# Patient Record
Sex: MALE | Race: WHITE | NOT HISPANIC OR LATINO
[De-identification: names, ages, dates, MRNs, and addresses within clinical notes are randomized per-mention and may not be internally consistent; named-entity substitution may affect disease eponyms.]

---

## 2017-02-07 ENCOUNTER — APPOINTMENT (OUTPATIENT)
Dept: INTERNAL MEDICINE | Facility: CLINIC | Age: 77
End: 2017-02-07

## 2017-06-08 ENCOUNTER — APPOINTMENT (OUTPATIENT)
Dept: INTERNAL MEDICINE | Facility: CLINIC | Age: 77
End: 2017-06-08

## 2017-06-26 ENCOUNTER — APPOINTMENT (OUTPATIENT)
Dept: INTERNAL MEDICINE | Facility: CLINIC | Age: 77
End: 2017-06-26

## 2017-07-24 ENCOUNTER — APPOINTMENT (OUTPATIENT)
Dept: INTERNAL MEDICINE | Facility: CLINIC | Age: 77
End: 2017-07-24

## 2017-09-06 ENCOUNTER — APPOINTMENT (OUTPATIENT)
Dept: INTERNAL MEDICINE | Facility: CLINIC | Age: 77
End: 2017-09-06
Payer: MEDICARE

## 2017-09-06 PROCEDURE — 90662 IIV NO PRSV INCREASED AG IM: CPT

## 2017-09-06 PROCEDURE — G0008: CPT

## 2017-09-06 PROCEDURE — 99214 OFFICE O/P EST MOD 30 MIN: CPT | Mod: 25

## 2017-11-22 ENCOUNTER — APPOINTMENT (OUTPATIENT)
Dept: INTERNAL MEDICINE | Facility: CLINIC | Age: 77
End: 2017-11-22
Payer: MEDICARE

## 2017-11-22 PROCEDURE — 99215 OFFICE O/P EST HI 40 MIN: CPT

## 2018-06-27 ENCOUNTER — APPOINTMENT (OUTPATIENT)
Dept: DERMATOLOGY | Facility: CLINIC | Age: 78
End: 2018-06-27
Payer: MEDICARE

## 2018-06-27 PROCEDURE — 11901 INJECT SKIN LESIONS >7: CPT

## 2018-06-27 PROCEDURE — 99202 OFFICE O/P NEW SF 15 MIN: CPT | Mod: 25

## 2018-07-23 ENCOUNTER — RECORD ABSTRACTING (OUTPATIENT)
Age: 78
End: 2018-07-23

## 2018-07-23 ENCOUNTER — RX RENEWAL (OUTPATIENT)
Age: 78
End: 2018-07-23

## 2018-07-23 DIAGNOSIS — Z92.3 PERSONAL HISTORY OF IRRADIATION: ICD-10-CM

## 2018-07-23 DIAGNOSIS — Z87.898 PERSONAL HISTORY OF OTHER SPECIFIED CONDITIONS: ICD-10-CM

## 2018-07-23 DIAGNOSIS — Z87.891 PERSONAL HISTORY OF NICOTINE DEPENDENCE: ICD-10-CM

## 2018-07-23 DIAGNOSIS — Z85.46 PERSONAL HISTORY OF MALIGNANT NEOPLASM OF PROSTATE: ICD-10-CM

## 2018-07-23 RX ORDER — CHLORHEXIDINE GLUCONATE 4 %
325 (65 FE) LIQUID (ML) TOPICAL 3 TIMES DAILY
Refills: 0 | Status: ACTIVE | COMMUNITY

## 2018-07-23 RX ORDER — MELOXICAM 15 MG/1
15 TABLET ORAL DAILY
Refills: 0 | Status: ACTIVE | COMMUNITY

## 2018-08-13 ENCOUNTER — APPOINTMENT (OUTPATIENT)
Dept: DERMATOLOGY | Facility: CLINIC | Age: 78
End: 2018-08-13
Payer: MEDICARE

## 2018-08-13 PROCEDURE — 99213 OFFICE O/P EST LOW 20 MIN: CPT

## 2018-08-16 ENCOUNTER — APPOINTMENT (OUTPATIENT)
Dept: INTERNAL MEDICINE | Facility: CLINIC | Age: 78
End: 2018-08-16
Payer: MEDICARE

## 2018-08-16 VITALS
HEIGHT: 62 IN | DIASTOLIC BLOOD PRESSURE: 80 MMHG | WEIGHT: 139 LBS | SYSTOLIC BLOOD PRESSURE: 130 MMHG | BODY MASS INDEX: 25.58 KG/M2

## 2018-08-16 DIAGNOSIS — J45.909 UNSPECIFIED ASTHMA, UNCOMPLICATED: ICD-10-CM

## 2018-08-16 DIAGNOSIS — M19.90 UNSPECIFIED OSTEOARTHRITIS, UNSPECIFIED SITE: ICD-10-CM

## 2018-08-16 DIAGNOSIS — L28.1 PRURIGO NODULARIS: ICD-10-CM

## 2018-08-16 PROCEDURE — 36415 COLL VENOUS BLD VENIPUNCTURE: CPT

## 2018-08-16 PROCEDURE — 99214 OFFICE O/P EST MOD 30 MIN: CPT | Mod: 25

## 2018-08-16 NOTE — HISTORY OF PRESENT ILLNESS
[FreeTextEntry1] : F/U  on B/P  [de-identified] : PT HERE FOR FOLLOW Overall feels okay he has seen a dermatologist and they made the diagnosis of PRURIGO NODULARIS

## 2018-08-24 ENCOUNTER — MEDICATION RENEWAL (OUTPATIENT)
Age: 78
End: 2018-08-24

## 2018-09-25 LAB
ALBUMIN SERPL ELPH-MCNC: 4.2 G/DL
ALP BLD-CCNC: 130 U/L
ALT SERPL-CCNC: 20 U/L
ANION GAP SERPL CALC-SCNC: 12 MMOL/L
AST SERPL-CCNC: 24 U/L
BASOPHILS # BLD AUTO: 0.03 K/UL
BASOPHILS NFR BLD AUTO: 0.8 %
BILIRUB SERPL-MCNC: 0.6 MG/DL
BUN SERPL-MCNC: 22 MG/DL
CALCIUM SERPL-MCNC: 9.5 MG/DL
CHLORIDE SERPL-SCNC: 103 MMOL/L
CO2 SERPL-SCNC: 27 MMOL/L
CREAT SERPL-MCNC: 1.02 MG/DL
EOSINOPHIL # BLD AUTO: 0.22 K/UL
EOSINOPHIL NFR BLD AUTO: 5.7 %
GLUCOSE SERPL-MCNC: 85 MG/DL
HCT VFR BLD CALC: 42.3 %
HGB BLD-MCNC: 13.5 G/DL
IMM GRANULOCYTES NFR BLD AUTO: 0.3 %
LYMPHOCYTES # BLD AUTO: 0.68 K/UL
LYMPHOCYTES NFR BLD AUTO: 17.7 %
MAN DIFF?: NORMAL
MCHC RBC-ENTMCNC: 30.5 PG
MCHC RBC-ENTMCNC: 31.9 GM/DL
MCV RBC AUTO: 95.7 FL
MONOCYTES # BLD AUTO: 0.32 K/UL
MONOCYTES NFR BLD AUTO: 8.3 %
NEUTROPHILS # BLD AUTO: 2.58 K/UL
NEUTROPHILS NFR BLD AUTO: 67.2 %
PLATELET # BLD AUTO: 239 K/UL
POTASSIUM SERPL-SCNC: 4.5 MMOL/L
PROT SERPL-MCNC: 6.2 G/DL
RBC # BLD: 4.42 M/UL
RBC # FLD: 13.1 %
SODIUM SERPL-SCNC: 142 MMOL/L
T4 SERPL-MCNC: 6.8 UG/DL
TSH SERPL-ACNC: 2.22 UIU/ML
WBC # FLD AUTO: 3.84 K/UL

## 2018-10-15 ENCOUNTER — APPOINTMENT (OUTPATIENT)
Dept: INTERNAL MEDICINE | Facility: CLINIC | Age: 78
End: 2018-10-15
Payer: MEDICARE

## 2018-10-15 ENCOUNTER — TRANSCRIPTION ENCOUNTER (OUTPATIENT)
Age: 78
End: 2018-10-15

## 2018-10-15 DIAGNOSIS — Z23 ENCOUNTER FOR IMMUNIZATION: ICD-10-CM

## 2018-10-15 PROCEDURE — 90662 IIV NO PRSV INCREASED AG IM: CPT

## 2018-10-15 PROCEDURE — G0008: CPT

## 2018-11-20 ENCOUNTER — MEDICATION RENEWAL (OUTPATIENT)
Age: 78
End: 2018-11-20

## 2019-01-07 ENCOUNTER — APPOINTMENT (OUTPATIENT)
Dept: INTERNAL MEDICINE | Facility: CLINIC | Age: 79
End: 2019-01-07
Payer: MEDICARE

## 2019-01-07 VITALS
TEMPERATURE: 98.9 F | BODY MASS INDEX: 25.76 KG/M2 | WEIGHT: 140 LBS | DIASTOLIC BLOOD PRESSURE: 70 MMHG | HEIGHT: 62 IN | SYSTOLIC BLOOD PRESSURE: 130 MMHG

## 2019-01-07 DIAGNOSIS — J45.909 UNSPECIFIED ASTHMA, UNCOMPLICATED: ICD-10-CM

## 2019-01-07 DIAGNOSIS — B97.89 ACUTE UPPER RESPIRATORY INFECTION, UNSPECIFIED: ICD-10-CM

## 2019-01-07 DIAGNOSIS — J06.9 ACUTE UPPER RESPIRATORY INFECTION, UNSPECIFIED: ICD-10-CM

## 2019-01-07 PROCEDURE — 99214 OFFICE O/P EST MOD 30 MIN: CPT

## 2019-01-07 RX ORDER — KETOCONAZOLE 20 MG/G
2 CREAM TOPICAL
Qty: 60 | Refills: 0 | Status: ACTIVE | COMMUNITY
Start: 2018-06-27

## 2019-01-07 RX ORDER — CLOBETASOL PROPIONATE 0.5 MG/G
0.05 CREAM TOPICAL
Qty: 60 | Refills: 0 | Status: ACTIVE | COMMUNITY
Start: 2018-06-29

## 2019-01-07 NOTE — HISTORY OF PRESENT ILLNESS
[FreeTextEntry1] : cough [de-identified] : Mr. PHILIP FORMAN is a 78 year male comes to the office c/o cough productive of yellow sputum x 1 weeks. Patient denies sick contact. Patient has no other complaints at this time.

## 2019-01-07 NOTE — PHYSICAL EXAM
[No Acute Distress] : no acute distress [Well Nourished] : well nourished [Well Developed] : well developed [Well-Appearing] : well-appearing [Normal Sclera/Conjunctiva] : normal sclera/conjunctiva [PERRL] : pupils equal round and reactive to light [EOMI] : extraocular movements intact [No JVD] : no jugular venous distention [Supple] : supple [No Lymphadenopathy] : no lymphadenopathy [No Respiratory Distress] : no respiratory distress  [No Accessory Muscle Use] : no accessory muscle use [Normal Rate] : normal rate  [Regular Rhythm] : with a regular rhythm [Normal S1, S2] : normal S1 and S2 [Soft] : abdomen soft [Non Tender] : non-tender [Non-distended] : non-distended [No Joint Swelling] : no joint swelling [Grossly Normal Strength/Tone] : grossly normal strength/tone [Normal Gait] : normal gait [Coordination Grossly Intact] : coordination grossly intact [No Focal Deficits] : no focal deficits [de-identified] : bilateral cerumen impaction, cobblestoning of pharynx [de-identified] : mild bilateral wheezing

## 2019-01-07 NOTE — PLAN
[FreeTextEntry1] : patient likely with bronchitis, will give steroids given history of asthma. patient advised to continue to take respiratory medications as prescribed and to go to the hospital if SOB worsens.\par \par Patient's signs and symptoms consistent with URI. Patient educated about signs and symptoms of URI, they typically last up to 14 days but may last longer. Patient advised to continue OTC medication for symptomatic relief. \par \par Counseling included abnormal lab results, differential diagnoses, treatment options, risks and benefits, lifestyle changes, prognosis, current condition, medications, and dose adjustments. \par The patient was interactive, attentive, asked questions, and verbalized understanding

## 2019-01-14 ENCOUNTER — RX RENEWAL (OUTPATIENT)
Age: 79
End: 2019-01-14

## 2019-01-24 ENCOUNTER — APPOINTMENT (OUTPATIENT)
Dept: INTERNAL MEDICINE | Facility: CLINIC | Age: 79
End: 2019-01-24
Payer: MEDICARE

## 2019-01-24 VITALS
BODY MASS INDEX: 25.95 KG/M2 | SYSTOLIC BLOOD PRESSURE: 130 MMHG | HEIGHT: 62 IN | WEIGHT: 141 LBS | DIASTOLIC BLOOD PRESSURE: 70 MMHG

## 2019-01-24 DIAGNOSIS — H61.23 IMPACTED CERUMEN, BILATERAL: ICD-10-CM

## 2019-01-24 PROCEDURE — 69209 REMOVE IMPACTED EAR WAX UNI: CPT | Mod: 50

## 2019-01-24 PROCEDURE — 99214 OFFICE O/P EST MOD 30 MIN: CPT | Mod: 25

## 2019-01-24 RX ORDER — BROMPHENIRAMINE MALEATE, PSEUDOEPHEDRINE HYDROCHLORIDE, 2; 30; 10 MG/5ML; MG/5ML; MG/5ML
30-2-10 SYRUP ORAL
Qty: 1 | Refills: 0 | Status: COMPLETED | COMMUNITY
Start: 2019-01-07 | End: 2019-01-24

## 2019-01-24 RX ORDER — PREDNISONE 20 MG/1
20 TABLET ORAL
Qty: 5 | Refills: 0 | Status: COMPLETED | COMMUNITY
Start: 2019-01-07 | End: 2019-01-24

## 2019-01-24 NOTE — HISTORY OF PRESENT ILLNESS
[FreeTextEntry1] : impacted ear [de-identified] : Mr. PHILIP FORMAN is a 78 year male comes to the office for cerumen removal. Patient feels well and has no complaints at this time.

## 2019-01-24 NOTE — PHYSICAL EXAM
[No Acute Distress] : no acute distress [Well Nourished] : well nourished [Well Developed] : well developed [Well-Appearing] : well-appearing [Normal Sclera/Conjunctiva] : normal sclera/conjunctiva [PERRL] : pupils equal round and reactive to light [EOMI] : extraocular movements intact [No JVD] : no jugular venous distention [Supple] : supple [No Lymphadenopathy] : no lymphadenopathy [No Respiratory Distress] : no respiratory distress  [Clear to Auscultation] : lungs were clear to auscultation bilaterally [No Accessory Muscle Use] : no accessory muscle use [Normal Rate] : normal rate  [Regular Rhythm] : with a regular rhythm [Normal S1, S2] : normal S1 and S2 [Soft] : abdomen soft [Non Tender] : non-tender [Non-distended] : non-distended [No Joint Swelling] : no joint swelling [Grossly Normal Strength/Tone] : grossly normal strength/tone [Normal Gait] : normal gait [Coordination Grossly Intact] : coordination grossly intact [No Focal Deficits] : no focal deficits [de-identified] : bilateral cerumen impaction, cobblestoning of pharynx

## 2019-01-24 NOTE — PLAN
[FreeTextEntry1] : patient's BP well controlled with current medication. will continue current  regimen \par \par Patient cerumen removed bilaterally with irrigation and manual removal of wax with plastic ear curette when visible from outside. Patient able to hear more clearly after procedure. \par \par Counseling included abnormal lab results, differential diagnoses, treatment options, risks and benefits, lifestyle changes, prognosis, current condition, medications, and dose adjustments. \par The patient was interactive, attentive, asked questions, and verbalized understanding

## 2019-01-25 LAB — RESPIRATORY PATH PNL SPEC CULT: ABNORMAL

## 2019-01-31 ENCOUNTER — RX RENEWAL (OUTPATIENT)
Age: 79
End: 2019-01-31

## 2019-03-07 ENCOUNTER — RX RENEWAL (OUTPATIENT)
Age: 79
End: 2019-03-07

## 2019-04-16 ENCOUNTER — RX RENEWAL (OUTPATIENT)
Age: 79
End: 2019-04-16

## 2019-04-30 ENCOUNTER — NON-APPOINTMENT (OUTPATIENT)
Age: 79
End: 2019-04-30

## 2019-04-30 ENCOUNTER — APPOINTMENT (OUTPATIENT)
Dept: INTERNAL MEDICINE | Facility: CLINIC | Age: 79
End: 2019-04-30
Payer: MEDICARE

## 2019-04-30 VITALS
HEIGHT: 62 IN | WEIGHT: 147.25 LBS | TEMPERATURE: 98.4 F | BODY MASS INDEX: 27.1 KG/M2 | DIASTOLIC BLOOD PRESSURE: 80 MMHG | HEART RATE: 74 BPM | OXYGEN SATURATION: 98 % | SYSTOLIC BLOOD PRESSURE: 130 MMHG

## 2019-04-30 DIAGNOSIS — Z23 ENCOUNTER FOR IMMUNIZATION: ICD-10-CM

## 2019-04-30 PROCEDURE — G0444 DEPRESSION SCREEN ANNUAL: CPT | Mod: 59

## 2019-04-30 PROCEDURE — 90732 PPSV23 VACC 2 YRS+ SUBQ/IM: CPT

## 2019-04-30 PROCEDURE — G0439: CPT

## 2019-04-30 PROCEDURE — 99497 ADVNCD CARE PLAN 30 MIN: CPT

## 2019-04-30 PROCEDURE — 99214 OFFICE O/P EST MOD 30 MIN: CPT | Mod: 25

## 2019-04-30 PROCEDURE — G0009: CPT

## 2019-04-30 NOTE — PLAN
[FreeTextEntry1] : In regards to patients Physical exam, routine blood work drawn, will review results with patient. EKG reviewed in office\par \par \par Counseling included abnormal lab results, differential diagnoses, treatment options, risks and benefits, lifestyle changes, prognosis, current condition, medications, and dose adjustments. \par The patient was interactive, attentive, asked questions, and verbalized understanding

## 2019-04-30 NOTE — HEALTH RISK ASSESSMENT
[Good] : ~his/her~  mood as  good [0] : 2) Feeling down, depressed, or hopeless: Not at all (0) [Patient declined Low Dose CT Scan] : Patient declined Low Dose CT Scan [Patient declined Retinal Exam] : Patient declined Retinal Exam. [Patient declined mammogram] : Patient declined mammogram [Patient declined PAP Smear] : Patient declined PAP Smear [Patient declined bone density test] : Patient declined bone density test [Patient declined colonoscopy] : Patient declined colonoscopy [HIV test declined] : HIV test declined [Hepatitis C test declined] : Hepatitis C test declined [Reviewed updated] : Reviewed updated [Designated Healthcare Proxy] : Designated healthcare proxy [Name: ___] : Health Care Proxy's Name: [unfilled]  [Relationship: ___] : Relationship: [unfilled] [Aggressive treatment] : aggressive treatment [I will adhere to the patient's wishes as expressed in the advance directive except as noted below.] : I will adhere to the patient's wishes as expressed in the advance directive except as noted below [] : No [[id number]] : 0 [AdvancecareDate] : 04/19

## 2019-04-30 NOTE — HISTORY OF PRESENT ILLNESS
[FreeTextEntry1] : physical  [de-identified] : Mr. PHILIP FORMAN is a 78 year male comes to the office PMH HTN, Hypothyroidism, BPH comes to the office for physical exam. Patient feels well and has no complaints at this time.

## 2019-05-01 LAB
ALBUMIN SERPL ELPH-MCNC: 4.1 G/DL
ALP BLD-CCNC: 127 U/L
ALT SERPL-CCNC: 25 U/L
ANION GAP SERPL CALC-SCNC: 12 MMOL/L
AST SERPL-CCNC: 23 U/L
BASOPHILS # BLD AUTO: 0.02 K/UL
BASOPHILS NFR BLD AUTO: 0.4 %
BILIRUB SERPL-MCNC: 0.6 MG/DL
BUN SERPL-MCNC: 21 MG/DL
CALCIUM SERPL-MCNC: 9.4 MG/DL
CHLORIDE SERPL-SCNC: 106 MMOL/L
CHOLEST SERPL-MCNC: 150 MG/DL
CHOLEST/HDLC SERPL: 2.3 RATIO
CO2 SERPL-SCNC: 27 MMOL/L
CREAT SERPL-MCNC: 1.08 MG/DL
EOSINOPHIL # BLD AUTO: 0 K/UL
EOSINOPHIL NFR BLD AUTO: 0 %
ESTIMATED AVERAGE GLUCOSE: 120 MG/DL
GLUCOSE SERPL-MCNC: 100 MG/DL
HBA1C MFR BLD HPLC: 5.8 %
HCT VFR BLD CALC: 43.5 %
HDLC SERPL-MCNC: 64 MG/DL
HGB BLD-MCNC: 13.9 G/DL
IMM GRANULOCYTES NFR BLD AUTO: 0.2 %
LDLC SERPL CALC-MCNC: 71 MG/DL
LYMPHOCYTES # BLD AUTO: 0.65 K/UL
LYMPHOCYTES NFR BLD AUTO: 11.9 %
MAN DIFF?: NORMAL
MCHC RBC-ENTMCNC: 31.9 PG
MCHC RBC-ENTMCNC: 32 GM/DL
MCV RBC AUTO: 99.8 FL
MONOCYTES # BLD AUTO: 0.53 K/UL
MONOCYTES NFR BLD AUTO: 9.7 %
NEUTROPHILS # BLD AUTO: 4.24 K/UL
NEUTROPHILS NFR BLD AUTO: 77.8 %
PLATELET # BLD AUTO: 219 K/UL
POTASSIUM SERPL-SCNC: 4.9 MMOL/L
PROT SERPL-MCNC: 6.3 G/DL
PSA SERPL-MCNC: 0.41 NG/ML
RBC # BLD: 4.36 M/UL
RBC # FLD: 12.6 %
SODIUM SERPL-SCNC: 145 MMOL/L
TRIGL SERPL-MCNC: 75 MG/DL
TSH SERPL-ACNC: 3.6 UIU/ML
WBC # FLD AUTO: 5.45 K/UL

## 2019-07-22 ENCOUNTER — MEDICATION RENEWAL (OUTPATIENT)
Age: 79
End: 2019-07-22

## 2019-07-29 ENCOUNTER — RX RENEWAL (OUTPATIENT)
Age: 79
End: 2019-07-29

## 2019-07-29 ENCOUNTER — MEDICATION RENEWAL (OUTPATIENT)
Age: 79
End: 2019-07-29

## 2019-07-30 ENCOUNTER — RX RENEWAL (OUTPATIENT)
Age: 79
End: 2019-07-30

## 2019-10-25 ENCOUNTER — RX RENEWAL (OUTPATIENT)
Age: 79
End: 2019-10-25

## 2019-10-28 ENCOUNTER — RX RENEWAL (OUTPATIENT)
Age: 79
End: 2019-10-28

## 2020-01-15 ENCOUNTER — RX RENEWAL (OUTPATIENT)
Age: 80
End: 2020-01-15

## 2020-02-05 ENCOUNTER — TRANSCRIPTION ENCOUNTER (OUTPATIENT)
Age: 80
End: 2020-02-05

## 2020-03-16 RX ORDER — FLUTICASONE PROPIONATE AND SALMETEROL 50; 100 UG/1; UG/1
100-50 POWDER RESPIRATORY (INHALATION)
Qty: 1 | Refills: 5 | Status: ACTIVE | COMMUNITY
Start: 2019-01-14 | End: 1900-01-01

## 2020-04-13 ENCOUNTER — APPOINTMENT (OUTPATIENT)
Dept: INTERNAL MEDICINE | Facility: CLINIC | Age: 80
End: 2020-04-13

## 2020-06-02 ENCOUNTER — NON-APPOINTMENT (OUTPATIENT)
Age: 80
End: 2020-06-02

## 2020-06-02 ENCOUNTER — APPOINTMENT (OUTPATIENT)
Dept: INTERNAL MEDICINE | Facility: CLINIC | Age: 80
End: 2020-06-02
Payer: MEDICARE

## 2020-06-02 VITALS
DIASTOLIC BLOOD PRESSURE: 74 MMHG | TEMPERATURE: 99 F | OXYGEN SATURATION: 98 % | HEIGHT: 62 IN | WEIGHT: 142.19 LBS | SYSTOLIC BLOOD PRESSURE: 132 MMHG | HEART RATE: 85 BPM | BODY MASS INDEX: 26.17 KG/M2

## 2020-06-02 DIAGNOSIS — Z20.828 CONTACT WITH AND (SUSPECTED) EXPOSURE TO OTHER VIRAL COMMUNICABLE DISEASES: ICD-10-CM

## 2020-06-02 DIAGNOSIS — E78.9 DISORDER OF LIPOPROTEIN METABOLISM, UNSPECIFIED: ICD-10-CM

## 2020-06-02 DIAGNOSIS — Z13.1 ENCOUNTER FOR SCREENING FOR DIABETES MELLITUS: ICD-10-CM

## 2020-06-02 DIAGNOSIS — Z13.220 ENCOUNTER FOR SCREENING FOR LIPOID DISORDERS: ICD-10-CM

## 2020-06-02 PROCEDURE — 99397 PER PM REEVAL EST PAT 65+ YR: CPT | Mod: 25

## 2020-06-02 PROCEDURE — 36415 COLL VENOUS BLD VENIPUNCTURE: CPT

## 2020-06-02 PROCEDURE — 93000 ELECTROCARDIOGRAM COMPLETE: CPT

## 2020-06-02 NOTE — PLAN
[FreeTextEntry1] : In regards to patients Physical exam, routine blood work drawn, will review results with patient. EKG reviewed in office\par \par patient's BP well controlled with current medication. will continue current  regimen\par \par In regards to patient's hypothyroidism, will re-test Thyroid levels today and adjust thyroid medication as indicated\par \par \par Counseling included abnormal lab results, differential diagnoses, treatment options, risks and benefits, lifestyle changes, prognosis, current condition, medications, and dose adjustments. \par The patient was interactive, attentive, asked questions, and verbalized understanding

## 2020-06-02 NOTE — PHYSICAL EXAM
[No Acute Distress] : no acute distress [Well Nourished] : well nourished [Well-Appearing] : well-appearing [Normal Sclera/Conjunctiva] : normal sclera/conjunctiva [Well Developed] : well developed [PERRL] : pupils equal round and reactive to light [EOMI] : extraocular movements intact [Normal Oropharynx] : the oropharynx was normal [Normal Outer Ear/Nose] : the outer ears and nose were normal in appearance [Supple] : supple [No JVD] : no jugular venous distention [No Respiratory Distress] : no respiratory distress  [Thyroid Normal, No Nodules] : the thyroid was normal and there were no nodules present [No Lymphadenopathy] : no lymphadenopathy [Normal Rate] : normal rate  [No Accessory Muscle Use] : no accessory muscle use [Clear to Auscultation] : lungs were clear to auscultation bilaterally [No Murmur] : no murmur heard [Normal S1, S2] : normal S1 and S2 [Regular Rhythm] : with a regular rhythm [No Carotid Bruits] : no carotid bruits [Pedal Pulses Present] : the pedal pulses are present [No Varicosities] : no varicosities [No Abdominal Bruit] : a ~M bruit was not heard ~T in the abdomen [No Extremity Clubbing/Cyanosis] : no extremity clubbing/cyanosis [No Palpable Aorta] : no palpable aorta [No Edema] : there was no peripheral edema [Non Tender] : non-tender [Soft] : abdomen soft [No Masses] : no abdominal mass palpated [Non-distended] : non-distended [No HSM] : no HSM [Normal Posterior Cervical Nodes] : no posterior cervical lymphadenopathy [Normal Bowel Sounds] : normal bowel sounds [No CVA Tenderness] : no CVA  tenderness [No Spinal Tenderness] : no spinal tenderness [Normal Anterior Cervical Nodes] : no anterior cervical lymphadenopathy [No Joint Swelling] : no joint swelling [Grossly Normal Strength/Tone] : grossly normal strength/tone [No Rash] : no rash [Normal Gait] : normal gait [Coordination Grossly Intact] : coordination grossly intact [Normal Affect] : the affect was normal [No Focal Deficits] : no focal deficits [Normal Insight/Judgement] : insight and judgment were intact

## 2020-06-02 NOTE — HEALTH RISK ASSESSMENT
[Good] : ~his/her~  mood as  good [0] : 1) Little interest or pleasure doing things: Not at all (0) [Patient declined Low Dose CT Scan] : Patient declined Low Dose CT Scan [Patient declined Retinal Exam] : Patient declined Retinal Exam. [Patient declined mammogram] : Patient declined mammogram [Patient declined PAP Smear] : Patient declined PAP Smear [Patient declined colonoscopy] : Patient declined colonoscopy [Patient declined bone density test] : Patient declined bone density test [HIV test declined] : HIV test declined [Hepatitis C test declined] : Hepatitis C test declined [Reviewed updated] : Reviewed updated [Designated Healthcare Proxy] : Designated healthcare proxy [Name: ___] : Health Care Proxy's Name: [unfilled]  [Relationship: ___] : Relationship: [unfilled] [Aggressive treatment] : aggressive treatment [I will adhere to the patient's wishes as expressed in the advance directive except as noted below.] : I will adhere to the patient's wishes as expressed in the advance directive except as noted below [] : No [[id number]] : 0 [AdvancecareDate] : 04/19

## 2020-06-02 NOTE — HISTORY OF PRESENT ILLNESS
[FreeTextEntry1] : physical  [de-identified] : Mr. PHILIP FORMAN is a 79 year male comes to the office PMH HTN, Hypothyroidism, BPH comes to the office for physical exam. Patient feels well and has no complaints at this time.

## 2020-06-03 LAB
ALBUMIN SERPL ELPH-MCNC: 4 G/DL
ALP BLD-CCNC: 93 U/L
ALT SERPL-CCNC: 14 U/L
ANION GAP SERPL CALC-SCNC: 10 MMOL/L
AST SERPL-CCNC: 23 U/L
BASOPHILS # BLD AUTO: 0.03 K/UL
BASOPHILS NFR BLD AUTO: 0.7 %
BILIRUB SERPL-MCNC: 0.4 MG/DL
BUN SERPL-MCNC: 24 MG/DL
CALCIUM SERPL-MCNC: 9.1 MG/DL
CHLORIDE SERPL-SCNC: 107 MMOL/L
CHOLEST SERPL-MCNC: 142 MG/DL
CHOLEST/HDLC SERPL: 2.4 RATIO
CO2 SERPL-SCNC: 25 MMOL/L
CREAT SERPL-MCNC: 1.22 MG/DL
EOSINOPHIL # BLD AUTO: 0.07 K/UL
EOSINOPHIL NFR BLD AUTO: 1.5 %
ESTIMATED AVERAGE GLUCOSE: 120 MG/DL
GLUCOSE SERPL-MCNC: 130 MG/DL
HBA1C MFR BLD HPLC: 5.8 %
HCT VFR BLD CALC: 35.5 %
HDLC SERPL-MCNC: 60 MG/DL
HGB BLD-MCNC: 10.9 G/DL
IMM GRANULOCYTES NFR BLD AUTO: 0.2 %
LDLC SERPL CALC-MCNC: 65 MG/DL
LYMPHOCYTES # BLD AUTO: 0.54 K/UL
LYMPHOCYTES NFR BLD AUTO: 11.9 %
MAN DIFF?: NORMAL
MCHC RBC-ENTMCNC: 29.1 PG
MCHC RBC-ENTMCNC: 30.7 GM/DL
MCV RBC AUTO: 94.9 FL
MONOCYTES # BLD AUTO: 0.48 K/UL
MONOCYTES NFR BLD AUTO: 10.6 %
NEUTROPHILS # BLD AUTO: 3.41 K/UL
NEUTROPHILS NFR BLD AUTO: 75.1 %
PLATELET # BLD AUTO: 266 K/UL
POTASSIUM SERPL-SCNC: 4.5 MMOL/L
PROT SERPL-MCNC: 5.9 G/DL
PSA SERPL-MCNC: 0.38 NG/ML
RBC # BLD: 3.74 M/UL
RBC # FLD: 12.3 %
SARS-COV-2 IGG SERPL IA-ACNC: 0.02 INDEX
SARS-COV-2 IGG SERPL QL IA: NEGATIVE
SODIUM SERPL-SCNC: 142 MMOL/L
TRIGL SERPL-MCNC: 86 MG/DL
TSH SERPL-ACNC: 1.92 UIU/ML
WBC # FLD AUTO: 4.54 K/UL

## 2020-06-15 ENCOUNTER — APPOINTMENT (OUTPATIENT)
Dept: INTERNAL MEDICINE | Facility: CLINIC | Age: 80
End: 2020-06-15
Payer: MEDICARE

## 2020-06-15 VITALS
BODY MASS INDEX: 26.13 KG/M2 | WEIGHT: 142 LBS | HEIGHT: 62 IN | SYSTOLIC BLOOD PRESSURE: 126 MMHG | TEMPERATURE: 98.3 F | DIASTOLIC BLOOD PRESSURE: 78 MMHG

## 2020-06-15 DIAGNOSIS — K21.9 GASTRO-ESOPHAGEAL REFLUX DISEASE W/OUT ESOPHAGITIS: ICD-10-CM

## 2020-06-15 PROCEDURE — 99214 OFFICE O/P EST MOD 30 MIN: CPT | Mod: 25

## 2020-06-15 RX ORDER — RABEPRAZOLE SODIUM 20 MG/1
20 TABLET, DELAYED RELEASE ORAL DAILY
Qty: 90 | Refills: 3 | Status: DISCONTINUED | COMMUNITY
End: 2020-06-15

## 2020-06-15 RX ORDER — EZETIMIBE 10 MG/1
10 TABLET ORAL DAILY
Qty: 90 | Refills: 3 | Status: ACTIVE | COMMUNITY
Start: 2020-06-15 | End: 1900-01-01

## 2020-06-15 NOTE — PHYSICAL EXAM
[Well Nourished] : well nourished [No Acute Distress] : no acute distress [Well-Appearing] : well-appearing [Well Developed] : well developed [Normal Sclera/Conjunctiva] : normal sclera/conjunctiva [PERRL] : pupils equal round and reactive to light [EOMI] : extraocular movements intact [No JVD] : no jugular venous distention [Normal Oropharynx] : the oropharynx was normal [Normal Outer Ear/Nose] : the outer ears and nose were normal in appearance [No Lymphadenopathy] : no lymphadenopathy [Supple] : supple [Thyroid Normal, No Nodules] : the thyroid was normal and there were no nodules present [Clear to Auscultation] : lungs were clear to auscultation bilaterally [No Respiratory Distress] : no respiratory distress  [No Accessory Muscle Use] : no accessory muscle use [Normal S1, S2] : normal S1 and S2 [Normal Rate] : normal rate  [Regular Rhythm] : with a regular rhythm [No Murmur] : no murmur heard [No Carotid Bruits] : no carotid bruits [Pedal Pulses Present] : the pedal pulses are present [No Abdominal Bruit] : a ~M bruit was not heard ~T in the abdomen [No Varicosities] : no varicosities [No Edema] : there was no peripheral edema [No Extremity Clubbing/Cyanosis] : no extremity clubbing/cyanosis [No Palpable Aorta] : no palpable aorta [Non-distended] : non-distended [Non Tender] : non-tender [Soft] : abdomen soft [No Masses] : no abdominal mass palpated [Normal Bowel Sounds] : normal bowel sounds [No HSM] : no HSM [No CVA Tenderness] : no CVA  tenderness [Normal Anterior Cervical Nodes] : no anterior cervical lymphadenopathy [Normal Posterior Cervical Nodes] : no posterior cervical lymphadenopathy [No Joint Swelling] : no joint swelling [Grossly Normal Strength/Tone] : grossly normal strength/tone [No Spinal Tenderness] : no spinal tenderness [No Rash] : no rash [No Focal Deficits] : no focal deficits [Coordination Grossly Intact] : coordination grossly intact [Normal Affect] : the affect was normal [Normal Gait] : normal gait [Normal Insight/Judgement] : insight and judgment were intact

## 2020-06-15 NOTE — PLAN
[FreeTextEntry1] : \par patient's BP well controlled with current medication. will continue current  regimen\par \par In regards to patient's GERD will prescribed Pantoprazole.\par \par In regards to patient's HLD, patient will take Zedia, as he is unable to tolerate statin medications. \par \par \par Counseling included abnormal lab results, differential diagnoses, treatment options, risks and benefits, lifestyle changes, prognosis, current condition, medications, and dose adjustments. \par The patient was interactive, attentive, asked questions, and verbalized understanding

## 2020-06-15 NOTE — HISTORY OF PRESENT ILLNESS
[FreeTextEntry1] : follow up of chronic medical conditions.  [de-identified] : Mr. PHILIP FORMAN is a 79 year male comes to the office PMH HTN, Hypothyroidism, BPH comes to the office for follow up of chronic medical conditions. Patient denies fever, cough SOB. No other complaints at this time.

## 2020-07-09 ENCOUNTER — RX RENEWAL (OUTPATIENT)
Age: 80
End: 2020-07-09

## 2020-10-27 ENCOUNTER — APPOINTMENT (OUTPATIENT)
Dept: INTERNAL MEDICINE | Facility: CLINIC | Age: 80
End: 2020-10-27
Payer: MEDICARE

## 2020-10-27 VITALS
HEART RATE: 78 BPM | HEIGHT: 62 IN | WEIGHT: 142 LBS | SYSTOLIC BLOOD PRESSURE: 139 MMHG | DIASTOLIC BLOOD PRESSURE: 71 MMHG | BODY MASS INDEX: 26.13 KG/M2 | TEMPERATURE: 97.8 F

## 2020-10-27 DIAGNOSIS — Z09 ENCOUNTER FOR FOLLOW-UP EXAMINATION AFTER COMPLETED TREATMENT FOR CONDITIONS OTHER THAN MALIGNANT NEOPLASM: ICD-10-CM

## 2020-10-27 PROCEDURE — 99072 ADDL SUPL MATRL&STAF TM PHE: CPT

## 2020-10-27 PROCEDURE — 36415 COLL VENOUS BLD VENIPUNCTURE: CPT

## 2020-10-27 PROCEDURE — 99215 OFFICE O/P EST HI 40 MIN: CPT | Mod: 25

## 2020-10-27 NOTE — HISTORY OF PRESENT ILLNESS
[FreeTextEntry1] : hospital follow up [de-identified] : Mr. PHILIP FORMAN is a 79 year male comes to the office [facility] HTN, Hypothyroidism, BPH comes to the office for follow up of chronic medical conditions and hospital discharge follow up. Patient was admitted to [facility] with SOB and epigastric pain 10/21/2020 Hemoglobin diagnosed with bleeding gastric ulcer on endoscopy patient was given 2 units PRBC and was discharge on 10/23/2020 with Hemoglobin 9.1.  Patient is asymptomatic at time of visit. Patient denies fever, cough SOB. No other complaints at this time.

## 2020-10-27 NOTE — PLAN
[FreeTextEntry1] : During conversation with patient extensive medical records were reviewed including but not limited to, Hospital records records, out patient records, laboratory data and microbiology data \par In addition extensive time was also spent in reviewing diagnostic studies.\par \par Total encounter total time 40 mins\par >50% of time spent counseling/coordinating care \par \par patient's BP well controlled with current medication. will continue current  regimen \par \par History of anemia, s/p 2 Units PRBC in hospital will retest H/H today\par \par History of gastric ulcer- Patient will follow up with GI doctor Dr. Taylor \par \par Counseling included abnormal lab results, differential diagnoses, treatment options, risks and benefits, lifestyle changes, current condition, medications, and dose adjustments. \par The patient was interactive, attentive, asked questions, and verbalized understanding

## 2020-10-28 LAB
HCT VFR BLD CALC: 28.7 %
HGB BLD-MCNC: 8.1 G/DL

## 2020-11-10 ENCOUNTER — RX RENEWAL (OUTPATIENT)
Age: 80
End: 2020-11-10

## 2020-11-16 ENCOUNTER — APPOINTMENT (OUTPATIENT)
Dept: INTERNAL MEDICINE | Facility: CLINIC | Age: 80
End: 2020-11-16
Payer: MEDICARE

## 2020-11-16 VITALS
HEIGHT: 62 IN | HEART RATE: 82 BPM | SYSTOLIC BLOOD PRESSURE: 132 MMHG | BODY MASS INDEX: 26.13 KG/M2 | WEIGHT: 142 LBS | DIASTOLIC BLOOD PRESSURE: 65 MMHG | TEMPERATURE: 98.4 F | OXYGEN SATURATION: 98 %

## 2020-11-16 DIAGNOSIS — Z12.11 ENCOUNTER FOR SCREENING FOR MALIGNANT NEOPLASM OF COLON: ICD-10-CM

## 2020-11-16 DIAGNOSIS — Z01.818 ENCOUNTER FOR OTHER PREPROCEDURAL EXAMINATION: ICD-10-CM

## 2020-11-16 PROCEDURE — 99072 ADDL SUPL MATRL&STAF TM PHE: CPT

## 2020-11-16 PROCEDURE — 99214 OFFICE O/P EST MOD 30 MIN: CPT

## 2020-11-18 NOTE — PLAN
[FreeTextEntry1] : Mr. PHILIP FORMAN is a 79 year male comes to the office PMH HTN, Hypothyroidism, BPH comes to the office for preoperative evaluation. Patient has greater than 4 METS, is an intermediate perioperative risk for Major adverse cardiac event. ECG and blood work reviewed. Cardiac clearance reviewed.  No further testing indicated at this time. Patient is medically optimized for this procedure. \par \par \par During conversation with patient extensive medical records were reviewed including but not limited to, Hospital records records, out patient records, laboratory data and microbiology data \par In addition extensive time was also spent in reviewing diagnostic studies.\par \par Total encounter total time 25 mins\par >50% of time spent counseling/coordinating care \par \par Counseling included abnormal lab results, differential diagnoses, treatment options, risks and benefits, lifestyle changes, current condition, medications, and dose adjustments. \par The patient was interactive, attentive, asked questions, and verbalized understanding

## 2020-11-18 NOTE — ASSESSMENT
[Patient Optimized for Surgery] : Patient optimized for surgery [No Further Testing Recommended] : no further testing recommended [FreeTextEntry4] : Mr. PHILIP FORMAN is a 79 year male comes to the office PMH HTN, Hypothyroidism, BPH comes to the office for preoperative evaluation. Patient has greater than 4 METS, is an intermediate perioperative risk for Major adverse cardiac event. ECG and blood work reviewed. Cardiac clearance reviewed.  No further testing indicated at this time. Patient is medically optimized for this procedure.

## 2020-11-18 NOTE — HISTORY OF PRESENT ILLNESS
[No Pertinent Cardiac History] : no history of aortic stenosis, atrial fibrillation, coronary artery disease, recent myocardial infarction, or implantable device/pacemaker [Asthma] : asthma [No Adverse Anesthesia Reaction] : no adverse anesthesia reaction in self or family member [(Patient denies any chest pain, claudication, dyspnea on exertion, orthopnea, palpitations or syncope)] : Patient denies any chest pain, claudication, dyspnea on exertion, orthopnea, palpitations or syncope [Moderate (4-6 METs)] : Moderate (4-6 METs) [Chronic Anticoagulation] : no chronic anticoagulation [Chronic Kidney Disease] : no chronic kidney disease [Diabetes] : no diabetes [FreeTextEntry1] : Colonoscopy [FreeTextEntry2] : 11/19/2020 [FreeTextEntry3] : Dr. Garcia [FreeTextEntry4] : Mr. PHILIP FORMAN is a 79 year male comes to the office PMH HTN, Hypothyroidism, BPH comes to the office for preoperative evaluation

## 2020-12-03 ENCOUNTER — RX RENEWAL (OUTPATIENT)
Age: 80
End: 2020-12-03

## 2020-12-04 ENCOUNTER — RX RENEWAL (OUTPATIENT)
Age: 80
End: 2020-12-04

## 2020-12-21 PROBLEM — J06.9 VIRAL URI WITH COUGH: Status: RESOLVED | Noted: 2019-01-07 | Resolved: 2020-12-21

## 2020-12-23 ENCOUNTER — APPOINTMENT (OUTPATIENT)
Dept: INTERNAL MEDICINE | Facility: CLINIC | Age: 80
End: 2020-12-23
Payer: MEDICARE

## 2020-12-23 ENCOUNTER — LABORATORY RESULT (OUTPATIENT)
Age: 80
End: 2020-12-23

## 2020-12-23 VITALS
DIASTOLIC BLOOD PRESSURE: 60 MMHG | HEART RATE: 69 BPM | SYSTOLIC BLOOD PRESSURE: 123 MMHG | HEIGHT: 62 IN | WEIGHT: 140 LBS | BODY MASS INDEX: 25.76 KG/M2 | TEMPERATURE: 98 F

## 2020-12-23 PROCEDURE — 99214 OFFICE O/P EST MOD 30 MIN: CPT | Mod: 25

## 2020-12-23 PROCEDURE — 99072 ADDL SUPL MATRL&STAF TM PHE: CPT

## 2020-12-23 PROCEDURE — 36415 COLL VENOUS BLD VENIPUNCTURE: CPT

## 2020-12-23 NOTE — HISTORY OF PRESENT ILLNESS
[FreeTextEntry1] : hospital follow up [de-identified] : Mr. PHILIP FORMAN is a 80 year male comes to the office PMH HTN, Hypothyroidism, BPH comes to the office for follow up of chronic medical conditions

## 2020-12-23 NOTE — PLAN
[FreeTextEntry1] : patient's BP well controlled with current medication. will continue current  regimen \par \par History of anemia, will retest cbc today\par \par History of gastric ulcer- Patient will follow up with GI doctor Dr. Taylor \par \par Counseling included abnormal lab results, differential diagnoses, treatment options, risks and benefits, lifestyle changes, current condition, medications, and dose adjustments. \par The patient was interactive, attentive, asked questions, and verbalized understanding

## 2020-12-28 LAB
25(OH)D3 SERPL-MCNC: 21.2 NG/ML
ALBUMIN SERPL ELPH-MCNC: 3.9 G/DL
ALP BLD-CCNC: 101 U/L
ALT SERPL-CCNC: 11 U/L
ANION GAP SERPL CALC-SCNC: 8 MMOL/L
AST SERPL-CCNC: 21 U/L
BASOPHILS # BLD AUTO: 0.06 K/UL
BASOPHILS NFR BLD AUTO: 1.6 %
BILIRUB SERPL-MCNC: 0.4 MG/DL
BUN SERPL-MCNC: 20 MG/DL
CALCIUM SERPL-MCNC: 9.2 MG/DL
CHLORIDE SERPL-SCNC: 105 MMOL/L
CHOLEST SERPL-MCNC: 133 MG/DL
CO2 SERPL-SCNC: 27 MMOL/L
CREAT SERPL-MCNC: 1.35 MG/DL
EOSINOPHIL # BLD AUTO: 0.11 K/UL
EOSINOPHIL NFR BLD AUTO: 2.9 %
ESTIMATED AVERAGE GLUCOSE: 88 MG/DL
GLUCOSE SERPL-MCNC: 84 MG/DL
HBA1C MFR BLD HPLC: 4.7 %
HCT VFR BLD CALC: 36.5 %
HDLC SERPL-MCNC: 57 MG/DL
HGB BLD-MCNC: 10.3 G/DL
IMM GRANULOCYTES NFR BLD AUTO: 0.3 %
LDLC SERPL CALC-MCNC: 58 MG/DL
LYMPHOCYTES # BLD AUTO: 0.53 K/UL
LYMPHOCYTES NFR BLD AUTO: 14.1 %
MAN DIFF?: NORMAL
MCHC RBC-ENTMCNC: 25.5 PG
MCHC RBC-ENTMCNC: 28.2 GM/DL
MCV RBC AUTO: 90.3 FL
MONOCYTES # BLD AUTO: 0.43 K/UL
MONOCYTES NFR BLD AUTO: 11.5 %
NEUTROPHILS # BLD AUTO: 2.61 K/UL
NEUTROPHILS NFR BLD AUTO: 69.6 %
NONHDLC SERPL-MCNC: 75 MG/DL
PLATELET # BLD AUTO: 270 K/UL
POTASSIUM SERPL-SCNC: 4.3 MMOL/L
PROT SERPL-MCNC: 6.2 G/DL
PSA SERPL-MCNC: 0.34 NG/ML
RBC # BLD: 4.04 M/UL
RBC # FLD: NORMAL
SODIUM SERPL-SCNC: 139 MMOL/L
TRIGL SERPL-MCNC: 88 MG/DL
TSH SERPL-ACNC: 1.92 UIU/ML
WBC # FLD AUTO: 3.75 K/UL

## 2021-01-04 NOTE — REVIEW OF SYSTEMS
[id number] [address] [facility] & [address]     Will facilitate refill.            
[Negative] : Heme/Lymph

## 2021-01-06 ENCOUNTER — RX RENEWAL (OUTPATIENT)
Age: 81
End: 2021-01-06

## 2021-03-12 ENCOUNTER — RX RENEWAL (OUTPATIENT)
Age: 81
End: 2021-03-12

## 2021-03-31 ENCOUNTER — RX RENEWAL (OUTPATIENT)
Age: 81
End: 2021-03-31

## 2021-05-06 ENCOUNTER — RX CHANGE (OUTPATIENT)
Age: 81
End: 2021-05-06

## 2021-05-06 RX ORDER — FLUTICASONE PROPIONATE AND SALMETEROL 100; 50 UG/1; UG/1
100-50 POWDER RESPIRATORY (INHALATION)
Qty: 60 | Refills: 5 | Status: DISCONTINUED | COMMUNITY
Start: 2020-11-10 | End: 2021-05-06

## 2021-05-06 RX ORDER — FLUTICASONE PROPIONATE AND SALMETEROL 100; 50 UG/1; UG/1
100-50 POWDER RESPIRATORY (INHALATION)
Qty: 180 | Refills: 2 | Status: ACTIVE | COMMUNITY
Start: 2021-05-06 | End: 1900-01-01

## 2021-05-27 ENCOUNTER — LABORATORY RESULT (OUTPATIENT)
Age: 81
End: 2021-05-27

## 2021-05-27 ENCOUNTER — APPOINTMENT (OUTPATIENT)
Dept: INTERNAL MEDICINE | Facility: CLINIC | Age: 81
End: 2021-05-27
Payer: MEDICARE

## 2021-05-27 VITALS
WEIGHT: 140 LBS | TEMPERATURE: 97 F | BODY MASS INDEX: 25.76 KG/M2 | DIASTOLIC BLOOD PRESSURE: 66 MMHG | HEIGHT: 62 IN | SYSTOLIC BLOOD PRESSURE: 128 MMHG | HEART RATE: 71 BPM

## 2021-05-27 DIAGNOSIS — D64.9 ANEMIA, UNSPECIFIED: ICD-10-CM

## 2021-05-27 DIAGNOSIS — K25.3 ACUTE GASTRIC ULCER W/OUT HEMORRHAGE OR PERFORATION: ICD-10-CM

## 2021-05-27 PROCEDURE — 99072 ADDL SUPL MATRL&STAF TM PHE: CPT

## 2021-05-27 PROCEDURE — 36415 COLL VENOUS BLD VENIPUNCTURE: CPT

## 2021-05-27 PROCEDURE — 99214 OFFICE O/P EST MOD 30 MIN: CPT | Mod: 25

## 2021-05-27 NOTE — HISTORY OF PRESENT ILLNESS
[FreeTextEntry1] : hospital follow up [de-identified] : Mr. PHILIP FORMAN is a 80 year male comes to the office PMH HTN, Hypothyroidism, BPH comes to the office for follow up of chronic medical conditions Patient denies fever, cough SOB. No other complaints at this time.

## 2021-05-27 NOTE — PLAN
[FreeTextEntry1] : patient's BP well controlled with current medication. will continue current  regimen \par \par History of anemia, will retest cbc today\par \par History of gastric ulcer- Patient will follow up with GI doctor Dr. Taylor \par \par Prior to appointment and during encounter with patient extensive medical records were reviewed including but not limited to, Hospital records records, out patient records, laboratory data and microbiology data \par In addition extensive time was also spent in reviewing diagnostic studies.\par \par Total encounter total time 30 mins\par >50% of time spent counseling/coordinating care\par \par \par Counseling included abnormal lab results, differential diagnoses, treatment options, risks and benefits, lifestyle changes, current condition, medications, and dose adjustments. \par The patient was interactive, attentive, asked questions, and verbalized understanding

## 2021-05-28 LAB
ALBUMIN SERPL ELPH-MCNC: 3.5 G/DL
ALP BLD-CCNC: 101 U/L
ALT SERPL-CCNC: 14 U/L
ANION GAP SERPL CALC-SCNC: 11 MMOL/L
AST SERPL-CCNC: 16 U/L
BASOPHILS # BLD AUTO: 0 K/UL
BASOPHILS NFR BLD AUTO: 0 %
BILIRUB SERPL-MCNC: 0.3 MG/DL
BUN SERPL-MCNC: 23 MG/DL
CALCIUM SERPL-MCNC: 8.8 MG/DL
CHLORIDE SERPL-SCNC: 103 MMOL/L
CHOLEST SERPL-MCNC: 134 MG/DL
CO2 SERPL-SCNC: 24 MMOL/L
CREAT SERPL-MCNC: 1.25 MG/DL
EOSINOPHIL # BLD AUTO: 0.08 K/UL
EOSINOPHIL NFR BLD AUTO: 1.7 %
ESTIMATED AVERAGE GLUCOSE: 105 MG/DL
GLUCOSE SERPL-MCNC: 193 MG/DL
HBA1C MFR BLD HPLC: 5.3 %
HCT VFR BLD CALC: 37.3 %
HDLC SERPL-MCNC: 51 MG/DL
HGB BLD-MCNC: 11.5 G/DL
LDLC SERPL CALC-MCNC: 64 MG/DL
LYMPHOCYTES # BLD AUTO: 0.37 K/UL
LYMPHOCYTES NFR BLD AUTO: 7.8 %
MAN DIFF?: NORMAL
MCHC RBC-ENTMCNC: 30.8 GM/DL
MCHC RBC-ENTMCNC: 31.3 PG
MCV RBC AUTO: 101.4 FL
MONOCYTES # BLD AUTO: 0.17 K/UL
MONOCYTES NFR BLD AUTO: 3.5 %
NEUTROPHILS # BLD AUTO: 4.17 K/UL
NEUTROPHILS NFR BLD AUTO: 87 %
NONHDLC SERPL-MCNC: 82 MG/DL
PLATELET # BLD AUTO: 239 K/UL
POTASSIUM SERPL-SCNC: 4.3 MMOL/L
PROT SERPL-MCNC: 5.6 G/DL
PSA SERPL-MCNC: 0.36 NG/ML
RBC # BLD: 3.68 M/UL
RBC # FLD: 13 %
SODIUM SERPL-SCNC: 138 MMOL/L
TRIGL SERPL-MCNC: 90 MG/DL
TSH SERPL-ACNC: 2.38 UIU/ML
WBC # FLD AUTO: 4.79 K/UL

## 2021-07-09 DIAGNOSIS — R21 RASH AND OTHER NONSPECIFIC SKIN ERUPTION: ICD-10-CM

## 2021-08-09 ENCOUNTER — RX RENEWAL (OUTPATIENT)
Age: 81
End: 2021-08-09

## 2021-09-22 ENCOUNTER — RX RENEWAL (OUTPATIENT)
Age: 81
End: 2021-09-22

## 2021-11-02 ENCOUNTER — RX RENEWAL (OUTPATIENT)
Age: 81
End: 2021-11-02

## 2021-11-09 ENCOUNTER — APPOINTMENT (OUTPATIENT)
Dept: INTERNAL MEDICINE | Facility: CLINIC | Age: 81
End: 2021-11-09
Payer: MEDICARE

## 2021-11-09 VITALS
SYSTOLIC BLOOD PRESSURE: 113 MMHG | TEMPERATURE: 97.6 F | WEIGHT: 147.25 LBS | HEIGHT: 62 IN | BODY MASS INDEX: 27.1 KG/M2 | DIASTOLIC BLOOD PRESSURE: 66 MMHG | HEART RATE: 86 BPM

## 2021-11-09 DIAGNOSIS — E78.5 HYPERLIPIDEMIA, UNSPECIFIED: ICD-10-CM

## 2021-11-09 DIAGNOSIS — R73.03 PREDIABETES.: ICD-10-CM

## 2021-11-09 DIAGNOSIS — N40.0 BENIGN PROSTATIC HYPERPLASIA WITHOUT LOWER URINARY TRACT SYMPMS: ICD-10-CM

## 2021-11-09 DIAGNOSIS — E03.9 HYPOTHYROIDISM, UNSPECIFIED: ICD-10-CM

## 2021-11-09 DIAGNOSIS — Z12.5 ENCOUNTER FOR SCREENING FOR MALIGNANT NEOPLASM OF PROSTATE: ICD-10-CM

## 2021-11-09 DIAGNOSIS — I10 ESSENTIAL (PRIMARY) HYPERTENSION: ICD-10-CM

## 2021-11-09 DIAGNOSIS — Z00.00 ENCOUNTER FOR GENERAL ADULT MEDICAL EXAMINATION W/OUT ABNORMAL FINDINGS: ICD-10-CM

## 2021-11-09 PROCEDURE — G0438: CPT

## 2021-11-09 PROCEDURE — 36415 COLL VENOUS BLD VENIPUNCTURE: CPT

## 2021-11-09 NOTE — HEALTH RISK ASSESSMENT
[Good] : ~his/her~  mood as  good [] : No [0] : 2) Feeling down, depressed, or hopeless: Not at all (0) [[id number]] : 0 [Patient declined Low Dose CT Scan] : Patient declined Low Dose CT Scan [Patient declined Retinal Exam] : Patient declined Retinal Exam. [Patient declined mammogram] : Patient declined mammogram [Patient declined PAP Smear] : Patient declined PAP Smear [Patient declined bone density test] : Patient declined bone density test [Patient declined colonoscopy] : Patient declined colonoscopy [HIV test declined] : HIV test declined [Hepatitis C test declined] : Hepatitis C test declined

## 2021-11-09 NOTE — PLAN
[FreeTextEntry1] : In regards to patients Physical exam, routine blood work drawn, will review results with patient.\par \par patient's BP well controlled with current medication. will continue current  regimen\par \par In regards to patient's hypothyroidism, will re-test Thyroid levels today and adjust thyroid medication as indicated\par \par \par Counseling included abnormal lab results, differential diagnoses, treatment options, risks and benefits, lifestyle changes, prognosis, current condition, medications, and dose adjustments. \par The patient was interactive, attentive, asked questions, and verbalized understanding

## 2021-11-09 NOTE — HISTORY OF PRESENT ILLNESS
[FreeTextEntry1] : physical  [de-identified] : Mr. PHILIP FORMAN is a 80 year male comes to the office PMH HTN, Hypothyroidism, BPH comes to the office for physical exam. Patient feels well and has no complaints at this time.

## 2021-11-10 LAB
ALBUMIN SERPL ELPH-MCNC: 3.3 G/DL
ALP BLD-CCNC: 90 U/L
ALT SERPL-CCNC: 9 U/L
ANION GAP SERPL CALC-SCNC: 12 MMOL/L
AST SERPL-CCNC: 19 U/L
BASOPHILS # BLD AUTO: 0.03 K/UL
BASOPHILS NFR BLD AUTO: 0.6 %
BILIRUB SERPL-MCNC: 0.3 MG/DL
BUN SERPL-MCNC: 28 MG/DL
CALCIUM SERPL-MCNC: 8.9 MG/DL
CHLORIDE SERPL-SCNC: 107 MMOL/L
CHOLEST SERPL-MCNC: 100 MG/DL
CO2 SERPL-SCNC: 22 MMOL/L
CREAT SERPL-MCNC: 1.29 MG/DL
EOSINOPHIL # BLD AUTO: 0.01 K/UL
EOSINOPHIL NFR BLD AUTO: 0.2 %
ESTIMATED AVERAGE GLUCOSE: 114 MG/DL
GLUCOSE SERPL-MCNC: 149 MG/DL
HBA1C MFR BLD HPLC: 5.6 %
HCT VFR BLD CALC: 33.6 %
HDLC SERPL-MCNC: 49 MG/DL
HGB BLD-MCNC: 10.5 G/DL
IMM GRANULOCYTES NFR BLD AUTO: 0 %
LDLC SERPL CALC-MCNC: 31 MG/DL
LYMPHOCYTES # BLD AUTO: 0.55 K/UL
LYMPHOCYTES NFR BLD AUTO: 10.4 %
MAN DIFF?: NORMAL
MCHC RBC-ENTMCNC: 31.3 GM/DL
MCHC RBC-ENTMCNC: 31.4 PG
MCV RBC AUTO: 100.6 FL
MONOCYTES # BLD AUTO: 0.45 K/UL
MONOCYTES NFR BLD AUTO: 8.5 %
NEUTROPHILS # BLD AUTO: 4.23 K/UL
NEUTROPHILS NFR BLD AUTO: 80.3 %
NONHDLC SERPL-MCNC: 51 MG/DL
PLATELET # BLD AUTO: 231 K/UL
POTASSIUM SERPL-SCNC: 4.3 MMOL/L
PROT SERPL-MCNC: 5.4 G/DL
PSA SERPL-MCNC: 0.32 NG/ML
RBC # BLD: 3.34 M/UL
RBC # FLD: 13.2 %
SODIUM SERPL-SCNC: 140 MMOL/L
TRIGL SERPL-MCNC: 104 MG/DL
TSH SERPL-ACNC: 3.72 UIU/ML
WBC # FLD AUTO: 5.27 K/UL

## 2021-12-13 ENCOUNTER — RX RENEWAL (OUTPATIENT)
Age: 81
End: 2021-12-13

## 2021-12-13 RX ORDER — TRIAMCINOLONE ACETONIDE 1 MG/G
0.1 CREAM TOPICAL
Qty: 454 | Refills: 0 | Status: ACTIVE | COMMUNITY
Start: 2021-07-09 | End: 1900-01-01

## 2021-12-23 ENCOUNTER — RX RENEWAL (OUTPATIENT)
Age: 81
End: 2021-12-23

## 2022-01-08 ENCOUNTER — RX RENEWAL (OUTPATIENT)
Age: 82
End: 2022-01-08

## 2022-01-08 RX ORDER — TERAZOSIN 5 MG/1
5 CAPSULE ORAL
Qty: 90 | Refills: 3 | Status: ACTIVE | COMMUNITY
Start: 2019-03-07 | End: 1900-01-01

## 2022-01-08 RX ORDER — LEVOTHYROXINE SODIUM 0.07 MG/1
75 TABLET ORAL DAILY
Qty: 90 | Refills: 3 | Status: ACTIVE | COMMUNITY
Start: 2019-10-25 | End: 1900-01-01

## 2022-01-15 ENCOUNTER — RX RENEWAL (OUTPATIENT)
Age: 82
End: 2022-01-15

## 2022-01-27 ENCOUNTER — RX RENEWAL (OUTPATIENT)
Age: 82
End: 2022-01-27

## 2022-01-27 RX ORDER — ROSUVASTATIN CALCIUM 5 MG/1
5 TABLET, FILM COATED ORAL DAILY
Qty: 90 | Refills: 1 | Status: ACTIVE | COMMUNITY
Start: 2020-06-02 | End: 1900-01-01

## 2022-01-31 ENCOUNTER — RX RENEWAL (OUTPATIENT)
Age: 82
End: 2022-01-31

## 2022-02-10 ENCOUNTER — RX RENEWAL (OUTPATIENT)
Age: 82
End: 2022-02-10

## 2022-03-03 ENCOUNTER — RX RENEWAL (OUTPATIENT)
Age: 82
End: 2022-03-03

## 2022-03-08 ENCOUNTER — RX RENEWAL (OUTPATIENT)
Age: 82
End: 2022-03-08

## 2022-03-08 RX ORDER — QUINAPRIL HYDROCHLORIDE 40 MG/1
40 TABLET, FILM COATED ORAL DAILY
Qty: 90 | Refills: 3 | Status: ACTIVE | COMMUNITY
Start: 2019-10-28 | End: 1900-01-01

## 2022-03-28 ENCOUNTER — RX RENEWAL (OUTPATIENT)
Age: 82
End: 2022-03-28

## 2022-03-28 RX ORDER — CELECOXIB 200 MG/1
200 CAPSULE ORAL
Qty: 30 | Refills: 0 | Status: ACTIVE | COMMUNITY
Start: 2018-08-16 | End: 1900-01-01

## 2022-04-05 ENCOUNTER — RX RENEWAL (OUTPATIENT)
Age: 82
End: 2022-04-05

## 2022-04-05 RX ORDER — PANTOPRAZOLE 40 MG/1
40 TABLET, DELAYED RELEASE ORAL
Qty: 90 | Refills: 0 | Status: ACTIVE | COMMUNITY
Start: 2020-06-15 | End: 1900-01-01

## 2022-04-11 ENCOUNTER — RX RENEWAL (OUTPATIENT)
Age: 82
End: 2022-04-11

## 2022-04-11 RX ORDER — FLUTICASONE PROPIONATE AND SALMETEROL 100; 50 UG/1; UG/1
100-50 POWDER RESPIRATORY (INHALATION)
Qty: 180 | Refills: 0 | Status: ACTIVE | COMMUNITY
Start: 2022-04-11 | End: 1900-01-01

## 2022-05-02 ENCOUNTER — OFFICE VISIT (OUTPATIENT)
Dept: URBAN - METROPOLITAN AREA CLINIC 26 | Facility: CLINIC | Age: 82
End: 2022-05-02
Payer: COMMERCIAL

## 2022-05-02 DIAGNOSIS — H35.372 PUCKERING OF MACULA, LEFT EYE: ICD-10-CM

## 2022-05-02 DIAGNOSIS — H04.123 TEAR FILM INSUFFICIENCY OF BILATERAL LACRIMAL GLANDS: ICD-10-CM

## 2022-05-02 DIAGNOSIS — H25.813 COMBINED FORMS OF AGE-RELATED CATARACT, BILATERAL: Primary | ICD-10-CM

## 2022-05-02 PROCEDURE — 92134 CPTRZ OPH DX IMG PST SGM RTA: CPT | Performed by: OPTOMETRIST

## 2022-05-02 PROCEDURE — 99204 OFFICE O/P NEW MOD 45 MIN: CPT | Performed by: OPTOMETRIST

## 2022-05-02 ASSESSMENT — VISUAL ACUITY
OD: 20/30
OS: 20/40ECC

## 2022-05-02 ASSESSMENT — INTRAOCULAR PRESSURE
OD: 21
OS: 20

## 2022-05-02 ASSESSMENT — KERATOMETRY
OD: 46.50
OS: 46.00

## 2022-05-02 NOTE — IMPRESSION/PLAN
Impression: Combined forms of age-related cataract, bilateral: H25.813. Plan:  Discussed cataracts with patient. Discussed treatment options. Surgical treatment is recommended. Surgical risks and benefits discussed. Patient elects surgical treatment. Recommend surgery OS. standard lens, LenSx, ORA, Aim OD: -0.25. Aim OS: -0.25. Needs Retina Clearance. Outcome of surgery limitations include:  Tear film insufficiency of bilateral lacrimal glands, Puckering of macula, left eye. 

ERM OS - needs retinal consult/clearance

## 2022-05-02 NOTE — IMPRESSION/PLAN
Impression: Puckering of macula, left eye: H35.372. Plan: Need retinal clearance prior to cataract surgery.

## 2022-05-31 ENCOUNTER — OFFICE VISIT (OUTPATIENT)
Dept: URBAN - METROPOLITAN AREA CLINIC 24 | Facility: CLINIC | Age: 82
End: 2022-05-31
Payer: COMMERCIAL

## 2022-05-31 DIAGNOSIS — H43.813 VITREOUS DEGENERATION, BILATERAL: ICD-10-CM

## 2022-05-31 DIAGNOSIS — H25.813 COMBINED FORMS OF AGE-RELATED CATARACT, BILATERAL: ICD-10-CM

## 2022-05-31 DIAGNOSIS — H35.372 PUCKERING OF MACULA, LEFT EYE: Primary | ICD-10-CM

## 2022-05-31 PROCEDURE — 99204 OFFICE O/P NEW MOD 45 MIN: CPT | Performed by: OPHTHALMOLOGY

## 2022-05-31 PROCEDURE — 92134 CPTRZ OPH DX IMG PST SGM RTA: CPT | Performed by: OPHTHALMOLOGY

## 2022-05-31 RX ORDER — OFLOXACIN 3 MG/ML
0.3 % SOLUTION/ DROPS OPHTHALMIC
Qty: 5 | Refills: 0 | Status: INACTIVE
Start: 2022-05-31 | End: 2022-06-06

## 2022-05-31 RX ORDER — PREDNISOLONE ACETATE 10 MG/ML
1 % SUSPENSION/ DROPS OPHTHALMIC
Qty: 5 | Refills: 0 | Status: ACTIVE
Start: 2022-05-31

## 2022-05-31 ASSESSMENT — INTRAOCULAR PRESSURE
OD: 15
OS: 14

## 2022-05-31 NOTE — IMPRESSION/PLAN
Impression: Puckering of macula, left eye: H35.372. Plan: moderate ERM, visually significant, recommend surgical intervention. disc r/b/a's, limited BCVA despite successful repair. pt elects to proceed AFTER cataract surgery RTC PPV MP OS
25 mins [id number]

## 2022-05-31 NOTE — IMPRESSION/PLAN
Impression: Combined forms of age-related cataract, bilateral: H25.813.  Plan: visually significant, recommend proceed with ce/iol FIRST then return to retina

## 2022-07-08 ENCOUNTER — TESTING ONLY (OUTPATIENT)
Dept: URBAN - METROPOLITAN AREA CLINIC 24 | Facility: CLINIC | Age: 82
End: 2022-07-08
Payer: COMMERCIAL

## 2022-07-08 DIAGNOSIS — H25.813 COMBINED FORMS OF AGE-RELATED CATARACT, BILATERAL: Primary | ICD-10-CM

## 2022-07-08 DIAGNOSIS — Z01.818 ENCOUNTER FOR OTHER PREPROCEDURAL EXAMINATION: Primary | ICD-10-CM

## 2022-07-08 PROCEDURE — 99213 OFFICE O/P EST LOW 20 MIN: CPT | Performed by: PHYSICIAN ASSISTANT

## 2022-07-08 ASSESSMENT — PACHYMETRY
OD: 21.82
OS: 2.10
OS: 21.35
OD: 2.23

## 2022-07-13 ENCOUNTER — PRE-OPERATIVE VISIT (OUTPATIENT)
Dept: URBAN - METROPOLITAN AREA CLINIC 24 | Facility: CLINIC | Age: 82
End: 2022-07-13
Payer: COMMERCIAL

## 2022-07-13 DIAGNOSIS — H35.372 PUCKERING OF MACULA, LEFT EYE: ICD-10-CM

## 2022-07-13 DIAGNOSIS — H43.813 VITREOUS DEGENERATION, BILATERAL: ICD-10-CM

## 2022-07-13 DIAGNOSIS — H04.123 TEAR FILM INSUFFICIENCY OF BILATERAL LACRIMAL GLANDS: ICD-10-CM

## 2022-07-13 DIAGNOSIS — H25.813 COMBINED FORMS OF AGE-RELATED CATARACT, BILATERAL: Primary | ICD-10-CM

## 2022-07-13 PROCEDURE — 99214 OFFICE O/P EST MOD 30 MIN: CPT | Performed by: OPHTHALMOLOGY

## 2022-07-13 RX ORDER — DICLOFENAC SODIUM 1 MG/ML
0.1 % SOLUTION/ DROPS OPHTHALMIC
Qty: 10 | Refills: 2 | Status: ACTIVE
Start: 2022-07-13

## 2022-07-13 RX ORDER — PREDNISOLONE ACETATE 10 MG/ML
1 % SUSPENSION/ DROPS OPHTHALMIC
Qty: 10 | Refills: 2 | Status: ACTIVE
Start: 2022-07-13

## 2022-07-13 NOTE — IMPRESSION/PLAN
Impression: Puckering of macula, left eye: H35.372. Plan: see Retina for  Sx after CEIOL. Discussed with patient, understands this may limit vision after surgery.

## 2022-07-18 ENCOUNTER — SURGERY (OUTPATIENT)
Dept: URBAN - METROPOLITAN AREA SURGERY 12 | Facility: SURGERY | Age: 82
End: 2022-07-18
Payer: COMMERCIAL

## 2022-07-18 DIAGNOSIS — H25.813 COMBINED FORMS OF AGE-RELATED CATARACT, BILATERAL: Primary | ICD-10-CM

## 2022-07-18 PROCEDURE — 66984 XCAPSL CTRC RMVL W/O ECP: CPT | Performed by: OPHTHALMOLOGY

## 2022-07-19 ENCOUNTER — POST-OPERATIVE VISIT (OUTPATIENT)
Dept: URBAN - METROPOLITAN AREA CLINIC 24 | Facility: CLINIC | Age: 82
End: 2022-07-19
Payer: COMMERCIAL

## 2022-07-19 DIAGNOSIS — Z48.810 ENCOUNTER FOR SURGICAL AFTERCARE FOLLOWING SURGERY ON A SENSE ORGAN: Primary | ICD-10-CM

## 2022-07-19 PROCEDURE — 99024 POSTOP FOLLOW-UP VISIT: CPT | Performed by: OPTOMETRIST

## 2022-07-19 ASSESSMENT — INTRAOCULAR PRESSURE
OS: 36
OS: 33

## 2022-07-19 NOTE — IMPRESSION/PLAN
Impression: S/P Cataract Extraction by phacoemulsification with IOL placement OS - 1 Day. Encounter for surgical aftercare following surgery on a sense organ  Z48.810.  Excellent post op course   Condition is improving - WATCH IOP  Plan: continue pred 1% and diclofenac qid OS as directed 
start brimonidine tid OS as directed

## 2022-07-26 ENCOUNTER — POST-OPERATIVE VISIT (OUTPATIENT)
Dept: URBAN - METROPOLITAN AREA CLINIC 24 | Facility: CLINIC | Age: 82
End: 2022-07-26
Payer: COMMERCIAL

## 2022-07-26 DIAGNOSIS — Z48.810 ENCOUNTER FOR SURGICAL AFTERCARE FOLLOWING SURGERY ON A SENSE ORGAN: Primary | ICD-10-CM

## 2022-07-26 PROCEDURE — 99024 POSTOP FOLLOW-UP VISIT: CPT | Performed by: OPTOMETRIST

## 2022-07-26 ASSESSMENT — INTRAOCULAR PRESSURE
OS: 23
OD: 16

## 2022-07-26 ASSESSMENT — VISUAL ACUITY
OS: 20/40
OD: 20/30

## 2022-07-26 NOTE — IMPRESSION/PLAN
Impression: S/P Cataract Extraction by phacoemulsification with IOL placement OS - 8 Days. Encounter for surgical aftercare following surgery on a sense organ  Z48.810. Excellent post op course   Condition is improving - Plan: continue pred 1%, diclofenac and brimonidine tid OD x 1 week the d/c as directed Pt requests to proceed w/ C&I OD prior to PPVIT OS -- will arrange w/ [address]. Post-op care [facility].

## 2022-07-28 ENCOUNTER — RX RENEWAL (OUTPATIENT)
Age: 82
End: 2022-07-28

## 2022-08-09 ENCOUNTER — ADULT PHYSICAL (OUTPATIENT)
Dept: URBAN - METROPOLITAN AREA CLINIC 24 | Facility: CLINIC | Age: 82
End: 2022-08-09
Payer: COMMERCIAL

## 2022-08-09 DIAGNOSIS — Z01.818 ENCOUNTER FOR OTHER PREPROCEDURAL EXAMINATION: Primary | ICD-10-CM

## 2022-08-09 DIAGNOSIS — H43.813 VITREOUS DEGENERATION, BILATERAL: ICD-10-CM

## 2022-08-09 DIAGNOSIS — H35.372 PUCKERING OF MACULA, LEFT EYE: ICD-10-CM

## 2022-08-09 PROCEDURE — 99213 OFFICE O/P EST LOW 20 MIN: CPT | Performed by: PHYSICIAN ASSISTANT

## 2022-08-18 ENCOUNTER — POST-OPERATIVE VISIT (OUTPATIENT)
Dept: URBAN - METROPOLITAN AREA CLINIC 26 | Facility: CLINIC | Age: 82
End: 2022-08-18
Payer: COMMERCIAL

## 2022-08-18 DIAGNOSIS — Z48.810 ENCOUNTER FOR SURGICAL AFTERCARE FOLLOWING SURGERY ON A SENSE ORGAN: Primary | ICD-10-CM

## 2022-08-18 PROCEDURE — 99024 POSTOP FOLLOW-UP VISIT: CPT | Performed by: OPTOMETRIST

## 2022-08-18 ASSESSMENT — INTRAOCULAR PRESSURE: OS: 24

## 2022-08-18 NOTE — IMPRESSION/PLAN
Impression: S/P Posterior Vitrectomy (PPVIT)/ERM peel/STTA/ afx/ Capsulotomy/ Laser OS - 1 Day. Encounter for surgical aftercare following surgery on a sense organ  Z48.810. Excellent post op course   Condition is improving - Plan: use drops as instructed. reviewed head positioning. rtc as scheduled with Dr. Selam Senior or prn.

## 2022-09-01 ENCOUNTER — OFFICE VISIT (OUTPATIENT)
Dept: URBAN - METROPOLITAN AREA CLINIC 24 | Facility: CLINIC | Age: 82
End: 2022-09-01
Payer: COMMERCIAL

## 2022-09-01 DIAGNOSIS — H43.813 VITREOUS DEGENERATION, BILATERAL: ICD-10-CM

## 2022-09-01 DIAGNOSIS — H35.372 PUCKERING OF MACULA, LEFT EYE: Primary | ICD-10-CM

## 2022-09-01 PROCEDURE — 92134 CPTRZ OPH DX IMG PST SGM RTA: CPT | Performed by: OPHTHALMOLOGY

## 2022-09-01 PROCEDURE — 99024 POSTOP FOLLOW-UP VISIT: CPT | Performed by: OPHTHALMOLOGY

## 2022-09-01 RX ORDER — PREDNISOLONE ACETATE 10 MG/ML
1 % SUSPENSION/ DROPS OPHTHALMIC
Qty: 10 | Refills: 1 | Status: ACTIVE
Start: 2022-09-01

## 2022-09-01 ASSESSMENT — INTRAOCULAR PRESSURE
OS: 19
OD: 20

## 2022-09-01 NOTE — IMPRESSION/PLAN
Impression: Puckering of macula, left Plan: ERM was significant w symptoms / distortion. DONE w VIT peel ERM /ILM STTA and AFx OS in Aug '22. TODAY postop IMPROVED. VA recovering. Small CME postop PERSIST -- INCR PF to BID and STAY ON GTTS. F/u Gen eye care 3-4w. STAY on PF / SLOW TAPER. RETINA PRN . REMINDED pt and gen eye team -- WILL NOT BE PERFECT even after ERM peel -and macula may be irreg. INCR GTTS if needed if any CME --    Long d/w pt, education. Prognosis guarded, wks / mo's recovery post op needed for BCVA and even w surgx, VA does not return to nml.

## 2022-09-09 ENCOUNTER — RX RENEWAL (OUTPATIENT)
Age: 82
End: 2022-09-09

## 2022-09-13 ENCOUNTER — ADULT PHYSICAL (OUTPATIENT)
Dept: URBAN - METROPOLITAN AREA CLINIC 24 | Facility: CLINIC | Age: 82
End: 2022-09-13
Payer: COMMERCIAL

## 2022-09-13 DIAGNOSIS — Z01.818 ENCOUNTER FOR OTHER PREPROCEDURAL EXAMINATION: Primary | ICD-10-CM

## 2022-09-13 DIAGNOSIS — H25.813 COMBINED FORMS OF AGE-RELATED CATARACT, BILATERAL: ICD-10-CM

## 2022-09-13 PROCEDURE — 99213 OFFICE O/P EST LOW 20 MIN: CPT | Performed by: PHYSICIAN ASSISTANT

## 2022-09-16 ENCOUNTER — TESTING ONLY (OUTPATIENT)
Dept: URBAN - METROPOLITAN AREA CLINIC 24 | Facility: CLINIC | Age: 82
End: 2022-09-16
Payer: COMMERCIAL

## 2022-09-16 DIAGNOSIS — H52.4 PRESBYOPIA: Primary | ICD-10-CM

## 2022-09-16 PROCEDURE — 92015 DETERMINE REFRACTIVE STATE: CPT | Performed by: STUDENT IN AN ORGANIZED HEALTH CARE EDUCATION/TRAINING PROGRAM

## 2022-09-16 ASSESSMENT — VISUAL ACUITY
OD: 20/40
OS: 20/60

## 2022-09-20 ENCOUNTER — SURGERY (OUTPATIENT)
Dept: URBAN - METROPOLITAN AREA SURGERY 12 | Facility: SURGERY | Age: 82
End: 2022-09-20
Payer: COMMERCIAL

## 2022-09-20 DIAGNOSIS — H25.811 COMBINED FORMS OF AGE-RELATED CATARACT, RIGHT EYE: Primary | ICD-10-CM

## 2022-09-20 PROCEDURE — 66984 XCAPSL CTRC RMVL W/O ECP: CPT | Performed by: OPHTHALMOLOGY

## 2022-09-21 ENCOUNTER — POST-OPERATIVE VISIT (OUTPATIENT)
Dept: URBAN - METROPOLITAN AREA CLINIC 26 | Facility: CLINIC | Age: 82
End: 2022-09-21
Payer: COMMERCIAL

## 2022-09-21 DIAGNOSIS — Z96.1 PRESENCE OF INTRAOCULAR LENS: Primary | ICD-10-CM

## 2022-09-21 PROCEDURE — 99024 POSTOP FOLLOW-UP VISIT: CPT | Performed by: OPTOMETRIST

## 2022-09-21 ASSESSMENT — INTRAOCULAR PRESSURE: OD: 20

## 2022-09-26 ENCOUNTER — RX RENEWAL (OUTPATIENT)
Age: 82
End: 2022-09-26

## 2022-10-24 ENCOUNTER — POST-OPERATIVE VISIT (OUTPATIENT)
Dept: URBAN - METROPOLITAN AREA CLINIC 26 | Facility: CLINIC | Age: 82
End: 2022-10-24
Payer: MEDICARE

## 2022-10-24 DIAGNOSIS — Z48.810 ENCOUNTER FOR SURGICAL AFTERCARE FOLLOWING SURGERY ON A SENSE ORGAN: ICD-10-CM

## 2022-10-24 DIAGNOSIS — H52.4 PRESBYOPIA: Primary | ICD-10-CM

## 2022-10-24 PROCEDURE — 99024 POSTOP FOLLOW-UP VISIT: CPT | Performed by: OPTOMETRIST

## 2022-10-24 ASSESSMENT — INTRAOCULAR PRESSURE
OD: 18
OS: 17

## 2022-10-24 ASSESSMENT — VISUAL ACUITY
OD: 20/25
OS: 20/40

## 2022-10-24 NOTE — IMPRESSION/PLAN
Impression: S/P Cataract Extraction by phacoemulsification with IOL placement OS - 98 Days. Encounter for surgical aftercare following surgery on a sense organ  Z48.810. Excellent post op course   Condition is improving - Plan: pt happy with vision. defers srx and will use otc readers for near vision tasks. retinal status OS limits vision.

## 2023-10-01 PROBLEM — Z92.3 HISTORY OF RADIATION THERAPY: Status: RESOLVED | Noted: 2018-07-23 | Resolved: 2023-10-01

## 2024-01-24 NOTE — PLAN
[FreeTextEntry1] : Patient for followup on his rash  he has seen a dermatologist and they recommended have some blood work for underlying disease we'll check lab \par if rash persists would consider CAT scan abdomen and pelvis to look for lymphadenopathy and as  possible underlying lymphoma I will followup
24-Jan-2024

## 2024-10-04 NOTE — IMPRESSION/PLAN
Impression: Combined forms of age-related cataract, bilateral: H25.813. Plan: Discussed cataract diagnosis with the patient. Discussed risks, benefits and alternatives to surgery including but not limited to: bleeding, infection, risk of vision loss, loss of the eye, need for other surgery. Patient voiced understanding and wishes to proceed. Patient elects surgical treatment. Advanced technology options Discussed with patient . Patient desires surgery OU, OS  only for now  (( AIM -0.25 OU, DEXYCU (+) drops  d/t ERM OS , Topical anesthesia, NO Lensx, NO LRI, declined ORA)) Patient understands the need for glasses after surgery for BCVA. 

 Pt to schedule  PPV MP OS with RETINA for after CEIOL OS [No] : In the past 12 months have you used drugs other than those required for medical reasons? No [0] : 2) Feeling down, depressed, or hopeless: Not at all (0) [de-identified] : Quit November 2022 [Never] : Never